# Patient Record
Sex: FEMALE | Race: BLACK OR AFRICAN AMERICAN | NOT HISPANIC OR LATINO | Employment: UNEMPLOYED | URBAN - METROPOLITAN AREA
[De-identification: names, ages, dates, MRNs, and addresses within clinical notes are randomized per-mention and may not be internally consistent; named-entity substitution may affect disease eponyms.]

---

## 2019-11-30 ENCOUNTER — HOSPITAL ENCOUNTER (EMERGENCY)
Facility: HOSPITAL | Age: 49
Discharge: HOME | End: 2019-11-30
Attending: EMERGENCY MEDICINE
Payer: COMMERCIAL

## 2019-11-30 VITALS
HEART RATE: 76 BPM | TEMPERATURE: 99 F | WEIGHT: 160 LBS | RESPIRATION RATE: 16 BRPM | BODY MASS INDEX: 25.71 KG/M2 | DIASTOLIC BLOOD PRESSURE: 65 MMHG | HEIGHT: 66 IN | OXYGEN SATURATION: 100 % | SYSTOLIC BLOOD PRESSURE: 125 MMHG

## 2019-11-30 DIAGNOSIS — G89.29 CHRONIC BILATERAL BACK PAIN, UNSPECIFIED BACK LOCATION: ICD-10-CM

## 2019-11-30 DIAGNOSIS — M54.9 CHRONIC BILATERAL BACK PAIN, UNSPECIFIED BACK LOCATION: ICD-10-CM

## 2019-11-30 DIAGNOSIS — M54.2 NECK PAIN: ICD-10-CM

## 2019-11-30 DIAGNOSIS — R51.9 NONINTRACTABLE EPISODIC HEADACHE, UNSPECIFIED HEADACHE TYPE: Primary | ICD-10-CM

## 2019-11-30 PROCEDURE — 96374 THER/PROPH/DIAG INJ IV PUSH: CPT

## 2019-11-30 PROCEDURE — 63600175 PHARM REV CODE 636 W HCPCS: Performed by: EMERGENCY MEDICINE

## 2019-11-30 PROCEDURE — 99284 EMERGENCY DEPT VISIT MOD MDM: CPT | Mod: 25

## 2019-11-30 PROCEDURE — 96361 HYDRATE IV INFUSION ADD-ON: CPT

## 2019-11-30 PROCEDURE — 96375 TX/PRO/DX INJ NEW DRUG ADDON: CPT

## 2019-11-30 RX ORDER — GABAPENTIN 300 MG/1
300 CAPSULE ORAL 3 TIMES DAILY
COMMUNITY

## 2019-11-30 RX ORDER — RIZATRIPTAN BENZOATE 10 MG/1
10 TABLET ORAL 3 TIMES DAILY PRN
Qty: 9 TABLET | Refills: 0 | Status: SHIPPED | OUTPATIENT
Start: 2019-11-30

## 2019-11-30 RX ORDER — METOCLOPRAMIDE HYDROCHLORIDE 5 MG/ML
10 INJECTION INTRAMUSCULAR; INTRAVENOUS
Status: COMPLETED | OUTPATIENT
Start: 2019-11-30 | End: 2019-11-30

## 2019-11-30 RX ORDER — METHOCARBAMOL 500 MG/1
500 TABLET, FILM COATED ORAL 4 TIMES DAILY
COMMUNITY

## 2019-11-30 RX ORDER — SUMATRIPTAN 50 MG/1
50 TABLET, FILM COATED ORAL
COMMUNITY
End: 2019-11-30 | Stop reason: ALTCHOICE

## 2019-11-30 RX ORDER — KETOROLAC TROMETHAMINE 30 MG/ML
10 INJECTION, SOLUTION INTRAMUSCULAR; INTRAVENOUS
Status: COMPLETED | OUTPATIENT
Start: 2019-11-30 | End: 2019-11-30

## 2019-11-30 RX ORDER — DULOXETIN HYDROCHLORIDE 30 MG/1
30 CAPSULE, DELAYED RELEASE ORAL DAILY
COMMUNITY

## 2019-11-30 RX ORDER — DEXAMETHASONE SODIUM PHOSPHATE 4 MG/ML
8 INJECTION, SOLUTION INTRA-ARTICULAR; INTRALESIONAL; INTRAMUSCULAR; INTRAVENOUS; SOFT TISSUE
Status: COMPLETED | OUTPATIENT
Start: 2019-11-30 | End: 2019-11-30

## 2019-11-30 RX ADMIN — KETOROLAC TROMETHAMINE 10 MG: 30 INJECTION, SOLUTION INTRAMUSCULAR at 10:11

## 2019-11-30 RX ADMIN — DEXAMETHASONE SODIUM PHOSPHATE 8 MG: 4 INJECTION, SOLUTION INTRA-ARTICULAR; INTRALESIONAL; INTRAMUSCULAR; INTRAVENOUS; SOFT TISSUE at 10:11

## 2019-11-30 RX ADMIN — SODIUM CHLORIDE 1000 ML: 0.9 INJECTION, SOLUTION INTRAVENOUS at 10:11

## 2019-11-30 RX ADMIN — METOCLOPRAMIDE 10 MG: 5 INJECTION, SOLUTION INTRAMUSCULAR; INTRAVENOUS at 10:11

## 2019-12-01 NOTE — ED NOTES
Pt. Resting with eyes closed and lights off in room, NADN, VSS, family x 3 at bedside, will continue to monitor.

## 2019-12-01 NOTE — ED PROVIDER NOTES
Sort note: Mary Good nontoxic/afebrile 49 y.o.  presented to the ED with c/o headache. Pt states she has chronic headaches nad gets migraines 2 times a week.  Pt states she sometimes cannot walk due to pain.  Pt states this headache started on Wednesday.  Pt attempted to take her home Imitrex with no relief.      On exam pt is A&Ox3. TTP to left trapezius muscle.  Pupils ERR 3-2mm.  Pt endorses photophobia and noise sensitivity.  Pt endorses low back pain to palpation.  Breath sounds clear.  Abdomen soft and nontender.       Patient seen and medically screened by Nurse practitioner in Sort process due to ED crowding.  Appropriate tests and/or medications ordered.  Care transferred to an alternate provider when patient was placed in an Exam Room from the [facility] for physical exam, additional orders, and disposition. 8:05 PM.     KELTON Andrews NP  11/30/19 2040

## 2019-12-01 NOTE — ED PROVIDER NOTES
Encounter Date: 11/30/2019    SCRIBE #1 NOTE: I, Ranjit Allan, am scribing for, and in the presence of, Jeffrey Mcbride MD.       History     Chief Complaint   Patient presents with    Headache     Back and neck  pain started Wednesday        Time seen by provider: 9:30 PM on 11/30/2019    Mary Good is a 49 y.o. female with PMHx of chronic headaches Dx as migraines who presents to the ED with an onset of headache beginning x4 days ago. The patient explains she is on imitrex, but with no resolution. The notes that she has had chronic neck and back pain for the past few months and adds that it radiates to the BLE and LUE.Other associated symptoms include phonophobia, photophobia, neck swelling, nausea, hip pain, and abdominal pain. The patient's daughter adds the the patient has seen many doctors in [place], where she lives, but has not received much relief for her symptoms. The patient denies vomiting, dysuria, weakness, Hx of drug use, numbness, malignancy, or any other symptoms at this time. No PSHx.      The history is provided by the patient and a relative.     Review of patient's allergies indicates:   Allergen Reactions    Aspirin      No past medical history on file.  No past surgical history on file.  No family history on file.  Social History     Tobacco Use    Smoking status: Not on file   Substance Use Topics    Alcohol use: Not on file    Drug use: Not on file     Review of Systems   Constitutional: Negative for fever.   HENT: Negative for sore throat.         +phonophobia   Eyes: Positive for photophobia.   Respiratory: Negative for shortness of breath.    Cardiovascular: Negative for chest pain.   Gastrointestinal: Positive for abdominal pain and nausea. Negative for vomiting.   Genitourinary: Negative for dysuria.   Musculoskeletal: Positive for arthralgias (BLE, LUE  , and hip), back pain and neck pain.   Skin: Negative for rash.   Neurological: Positive for headaches. Negative for weakness  and numbness.   Hematological: Does not bruise/bleed easily.       Physical Exam     Initial Vitals [11/30/19 2004]   BP Pulse Resp Temp SpO2   118/61 92 16 98.7 °F (37.1 °C) 99 %      MAP       --         Physical Exam    Nursing note and vitals reviewed.  Constitutional: She appears well-developed and well-nourished. She is not diaphoretic. No distress.   HENT:   Head: Normocephalic and atraumatic.   Mouth/Throat: Oropharynx is clear and moist.   Eyes: Conjunctivae are normal.   Mild photophobia.   Neck: Neck supple.   Cardiovascular: Normal rate, regular rhythm, normal heart sounds and intact distal pulses. Exam reveals no gallop and no friction rub.    No murmur heard.  Pulmonary/Chest: Breath sounds normal. She has no wheezes. She has no rhonchi. She has no rales.   Abdominal: Soft. She exhibits no distension. There is no tenderness.   Musculoskeletal: Normal range of motion.   Left lateral cervical pain extending to trapezius. Mild left lateral lumbar tenderness.   Neurological: She is alert and oriented to person, place, and time.   Cranial nerves III-XII intact. 5/5 Strength and sensation to light touch.   Skin: No rash noted. No erythema.   Psychiatric: Her speech is normal.         ED Course   Procedures  Labs Reviewed - No data to display       Imaging Results    None          Medical Decision Making:   History:   Old Medical Records: I decided to obtain old medical records.            Scribe Attestation:   Scribe #1: I performed the above scribed service and the documentation accurately describes the services I performed. I attest to the accuracy of the note.    I, Dr. Jeffrey Mcbride, personally performed the services described in this documentation. All medical record entries made by the scribe were at my direction and in my presence.  I have reviewed the chart and agree that the record reflects my personal performance and is accurate and complete. Jeffrey Mcbride MD.  4:45 AM  12/01/2019    Mary Good is a 49 y.o. female presenting with multiple complaints including headache in the setting of chronic migraine headaches as well as chronic, unchanged back pain. The patient has a nonfocal neurological examination with no red flags.  I doubt acute spinal cord processes requiring further spinal imaging such as CT or MRI.  I doubt epidural abscesses, severe deep space infection, transverse myelitis, discitis, cauda equina syndrome, or other emergent conditions.  Multiple medicines given here including for headache.  I have very low suspicion for alternative causes of headache such as intracranial hemorrhage, ischemic process, meningitis, idiopathic intracranial hypertension, or cavernous venous sinus thrombosis.  The patient has a non-focal neurological examination with history not consistent with emergent causes of headache warranting present therapeutic intervention.  I do not think further brain imaging or lumbar puncture are indicated at this time.  Follow up outpatient with PCP and Neurology.  Detailed return precautions reviewed.                      Clinical Impression:       ICD-10-CM ICD-9-CM   1. Nonintractable episodic headache, unspecified headache type R51 784.0   2. Chronic bilateral back pain, unspecified back location M54.9 724.5    G89.29 338.29   3. Neck pain M54.2 723.1         Disposition:   Disposition: Discharged  Condition: Stable                     Jeffrey Mcbride MD  12/01/19 7278